# Patient Record
Sex: FEMALE | Race: WHITE | Employment: UNEMPLOYED | URBAN - METROPOLITAN AREA
[De-identification: names, ages, dates, MRNs, and addresses within clinical notes are randomized per-mention and may not be internally consistent; named-entity substitution may affect disease eponyms.]

---

## 2019-06-12 RX ORDER — TROPICAMIDE 10 MG/ML
1 SOLUTION/ DROPS OPHTHALMIC
Status: CANCELLED | OUTPATIENT
Start: 2019-06-12 | End: 2019-06-12

## 2019-06-12 RX ORDER — CYCLOPENTOLATE HYDROCHLORIDE 20 MG/ML
1 SOLUTION/ DROPS OPHTHALMIC
Status: CANCELLED | OUTPATIENT
Start: 2019-06-12 | End: 2019-06-12

## 2019-06-12 RX ORDER — PHENYLEPHRINE HYDROCHLORIDE 25 MG/ML
1 SOLUTION/ DROPS OPHTHALMIC
Status: CANCELLED | OUTPATIENT
Start: 2019-06-12 | End: 2019-06-12

## 2019-06-24 ENCOUNTER — ANESTHESIA EVENT (OUTPATIENT)
Dept: SURGERY | Age: 59
End: 2019-06-24
Payer: MEDICARE

## 2019-06-25 ENCOUNTER — HOSPITAL ENCOUNTER (OUTPATIENT)
Age: 59
Setting detail: OUTPATIENT SURGERY
Discharge: HOME | End: 2019-06-25
Attending: OPHTHALMOLOGY | Admitting: OPHTHALMOLOGY
Payer: MEDICARE

## 2019-06-25 ENCOUNTER — ANESTHESIA (OUTPATIENT)
Dept: SURGERY | Age: 59
End: 2019-06-25
Payer: MEDICARE

## 2019-06-25 VITALS
OXYGEN SATURATION: 96 % | TEMPERATURE: 98.8 F | SYSTOLIC BLOOD PRESSURE: 148 MMHG | RESPIRATION RATE: 15 BRPM | BODY MASS INDEX: 33.05 KG/M2 | DIASTOLIC BLOOD PRESSURE: 68 MMHG | WEIGHT: 211 LBS | HEART RATE: 56 BPM

## 2019-06-25 PROCEDURE — 76010000138 HC OR TIME 0.5 TO 1 HR: Performed by: OPHTHALMOLOGY

## 2019-06-25 PROCEDURE — 74011250636 HC RX REV CODE- 250/636: Performed by: OPHTHALMOLOGY

## 2019-06-25 PROCEDURE — 74011250637 HC RX REV CODE- 250/637: Performed by: ANESTHESIOLOGY

## 2019-06-25 PROCEDURE — 77030013673 HC FCPS OPTH ALCN -C: Performed by: OPHTHALMOLOGY

## 2019-06-25 PROCEDURE — 74011250636 HC RX REV CODE- 250/636

## 2019-06-25 PROCEDURE — 77030018837 HC SOL IRR OPTH ALCN -A: Performed by: OPHTHALMOLOGY

## 2019-06-25 PROCEDURE — 77030017621 HC NDL OPHTH1 ALCN -B: Performed by: OPHTHALMOLOGY

## 2019-06-25 PROCEDURE — 77030018846 HC SOL IRR STRL H20 ICUM -A: Performed by: OPHTHALMOLOGY

## 2019-06-25 PROCEDURE — 76060000033 HC ANESTHESIA 1 TO 1.5 HR: Performed by: OPHTHALMOLOGY

## 2019-06-25 PROCEDURE — 77030038091: Performed by: OPHTHALMOLOGY

## 2019-06-25 PROCEDURE — 74011000254 HC RX REV CODE- 254: Performed by: OPHTHALMOLOGY

## 2019-06-25 PROCEDURE — 77030029764 HC VLV ENTRY OPHTH SYS ALCN -C: Performed by: OPHTHALMOLOGY

## 2019-06-25 PROCEDURE — 76210000063 HC OR PH I REC FIRST 0.5 HR: Performed by: OPHTHALMOLOGY

## 2019-06-25 PROCEDURE — 76210000021 HC REC RM PH II 0.5 TO 1 HR: Performed by: OPHTHALMOLOGY

## 2019-06-25 PROCEDURE — 77030008547 HC TBNG OPHTH BD -A: Performed by: OPHTHALMOLOGY

## 2019-06-25 PROCEDURE — 77030032623 HC KT VITRCMY TOT PLS ALCN -F: Performed by: OPHTHALMOLOGY

## 2019-06-25 PROCEDURE — 74011250636 HC RX REV CODE- 250/636: Performed by: ANESTHESIOLOGY

## 2019-06-25 PROCEDURE — 77030018838 HC SOL IRR OPTH ALCN -B: Performed by: OPHTHALMOLOGY

## 2019-06-25 PROCEDURE — 74011000250 HC RX REV CODE- 250: Performed by: OPHTHALMOLOGY

## 2019-06-25 RX ORDER — HYDROMORPHONE HYDROCHLORIDE 2 MG/ML
0.5 INJECTION, SOLUTION INTRAMUSCULAR; INTRAVENOUS; SUBCUTANEOUS
Status: DISCONTINUED | OUTPATIENT
Start: 2019-06-25 | End: 2019-06-25 | Stop reason: HOSPADM

## 2019-06-25 RX ORDER — MIDAZOLAM HYDROCHLORIDE 1 MG/ML
2 INJECTION, SOLUTION INTRAMUSCULAR; INTRAVENOUS
Status: COMPLETED | OUTPATIENT
Start: 2019-06-25 | End: 2019-06-25

## 2019-06-25 RX ORDER — OXYCODONE HYDROCHLORIDE 5 MG/1
10 TABLET ORAL
Status: DISCONTINUED | OUTPATIENT
Start: 2019-06-25 | End: 2019-06-25 | Stop reason: HOSPADM

## 2019-06-25 RX ORDER — FENTANYL CITRATE 50 UG/ML
100 INJECTION, SOLUTION INTRAMUSCULAR; INTRAVENOUS ONCE
Status: DISCONTINUED | OUTPATIENT
Start: 2019-06-25 | End: 2019-06-25 | Stop reason: HOSPADM

## 2019-06-25 RX ORDER — PHENYLEPHRINE HYDROCHLORIDE 25 MG/ML
1 SOLUTION/ DROPS OPHTHALMIC
Status: COMPLETED | OUTPATIENT
Start: 2019-06-25 | End: 2019-06-25

## 2019-06-25 RX ORDER — ACETAMINOPHEN 500 MG
1000 TABLET ORAL ONCE
Status: DISCONTINUED | OUTPATIENT
Start: 2019-06-25 | End: 2019-06-25 | Stop reason: HOSPADM

## 2019-06-25 RX ORDER — SODIUM CHLORIDE, SODIUM LACTATE, POTASSIUM CHLORIDE, CALCIUM CHLORIDE 600; 310; 30; 20 MG/100ML; MG/100ML; MG/100ML; MG/100ML
100 INJECTION, SOLUTION INTRAVENOUS CONTINUOUS
Status: DISCONTINUED | OUTPATIENT
Start: 2019-06-25 | End: 2019-06-25 | Stop reason: HOSPADM

## 2019-06-25 RX ORDER — NALOXONE HYDROCHLORIDE 0.4 MG/ML
0.2 INJECTION, SOLUTION INTRAMUSCULAR; INTRAVENOUS; SUBCUTANEOUS AS NEEDED
Status: DISCONTINUED | OUTPATIENT
Start: 2019-06-25 | End: 2019-06-25 | Stop reason: HOSPADM

## 2019-06-25 RX ORDER — BUPIVACAINE HYDROCHLORIDE 5 MG/ML
INJECTION, SOLUTION EPIDURAL; INTRACAUDAL AS NEEDED
Status: DISCONTINUED | OUTPATIENT
Start: 2019-06-25 | End: 2019-06-25 | Stop reason: HOSPADM

## 2019-06-25 RX ORDER — MIDAZOLAM HYDROCHLORIDE 1 MG/ML
2 INJECTION, SOLUTION INTRAMUSCULAR; INTRAVENOUS ONCE
Status: DISCONTINUED | OUTPATIENT
Start: 2019-06-25 | End: 2019-06-25 | Stop reason: HOSPADM

## 2019-06-25 RX ORDER — OXYCODONE HYDROCHLORIDE 5 MG/1
5 TABLET ORAL
Status: COMPLETED | OUTPATIENT
Start: 2019-06-25 | End: 2019-06-25

## 2019-06-25 RX ORDER — CYCLOPENTOLATE HYDROCHLORIDE 20 MG/ML
1 SOLUTION/ DROPS OPHTHALMIC
Status: COMPLETED | OUTPATIENT
Start: 2019-06-25 | End: 2019-06-25

## 2019-06-25 RX ORDER — PROPOFOL 10 MG/ML
INJECTION, EMULSION INTRAVENOUS AS NEEDED
Status: DISCONTINUED | OUTPATIENT
Start: 2019-06-25 | End: 2019-06-25 | Stop reason: HOSPADM

## 2019-06-25 RX ORDER — LIDOCAINE HYDROCHLORIDE 10 MG/ML
0.1 INJECTION INFILTRATION; PERINEURAL AS NEEDED
Status: DISCONTINUED | OUTPATIENT
Start: 2019-06-25 | End: 2019-06-25 | Stop reason: HOSPADM

## 2019-06-25 RX ORDER — FLUMAZENIL 0.1 MG/ML
0.2 INJECTION INTRAVENOUS
Status: DISCONTINUED | OUTPATIENT
Start: 2019-06-25 | End: 2019-06-25 | Stop reason: HOSPADM

## 2019-06-25 RX ORDER — SODIUM CHLORIDE 0.9 % (FLUSH) 0.9 %
5-40 SYRINGE (ML) INJECTION AS NEEDED
Status: DISCONTINUED | OUTPATIENT
Start: 2019-06-25 | End: 2019-06-25 | Stop reason: HOSPADM

## 2019-06-25 RX ORDER — BETAMETHASONE SODIUM PHOSPHATE AND BETAMETHASONE ACETATE 3; 3 MG/ML; MG/ML
INJECTION, SUSPENSION INTRA-ARTICULAR; INTRALESIONAL; INTRAMUSCULAR; SOFT TISSUE AS NEEDED
Status: DISCONTINUED | OUTPATIENT
Start: 2019-06-25 | End: 2019-06-25 | Stop reason: HOSPADM

## 2019-06-25 RX ORDER — FENTANYL CITRATE 50 UG/ML
INJECTION, SOLUTION INTRAMUSCULAR; INTRAVENOUS AS NEEDED
Status: DISCONTINUED | OUTPATIENT
Start: 2019-06-25 | End: 2019-06-25 | Stop reason: HOSPADM

## 2019-06-25 RX ORDER — CEFAZOLIN SODIUM 1 G/3ML
INJECTION, POWDER, FOR SOLUTION INTRAMUSCULAR; INTRAVENOUS AS NEEDED
Status: DISCONTINUED | OUTPATIENT
Start: 2019-06-25 | End: 2019-06-25 | Stop reason: HOSPADM

## 2019-06-25 RX ORDER — INDOCYANINE GREEN AND WATER 25 MG
KIT INJECTION AS NEEDED
Status: DISCONTINUED | OUTPATIENT
Start: 2019-06-25 | End: 2019-06-25 | Stop reason: HOSPADM

## 2019-06-25 RX ORDER — SODIUM CHLORIDE 0.9 % (FLUSH) 0.9 %
5-40 SYRINGE (ML) INJECTION EVERY 8 HOURS
Status: DISCONTINUED | OUTPATIENT
Start: 2019-06-25 | End: 2019-06-25 | Stop reason: HOSPADM

## 2019-06-25 RX ORDER — LIDOCAINE HYDROCHLORIDE 20 MG/ML
INJECTION, SOLUTION INFILTRATION; PERINEURAL AS NEEDED
Status: DISCONTINUED | OUTPATIENT
Start: 2019-06-25 | End: 2019-06-25 | Stop reason: HOSPADM

## 2019-06-25 RX ORDER — DIPHENHYDRAMINE HYDROCHLORIDE 50 MG/ML
12.5 INJECTION, SOLUTION INTRAMUSCULAR; INTRAVENOUS
Status: DISCONTINUED | OUTPATIENT
Start: 2019-06-25 | End: 2019-06-25 | Stop reason: HOSPADM

## 2019-06-25 RX ORDER — TROPICAMIDE 10 MG/ML
1 SOLUTION/ DROPS OPHTHALMIC
Status: COMPLETED | OUTPATIENT
Start: 2019-06-25 | End: 2019-06-25

## 2019-06-25 RX ADMIN — CYCLOPENTOLATE HYDROCHLORIDE 1 DROP: 20 SOLUTION/ DROPS OPHTHALMIC at 08:00

## 2019-06-25 RX ADMIN — FENTANYL CITRATE 50 MCG: 50 INJECTION, SOLUTION INTRAMUSCULAR; INTRAVENOUS at 09:29

## 2019-06-25 RX ADMIN — CYCLOPENTOLATE HYDROCHLORIDE 1 DROP: 20 SOLUTION/ DROPS OPHTHALMIC at 08:10

## 2019-06-25 RX ADMIN — PHENYLEPHRINE HYDROCHLORIDE 1 DROP: 25 SOLUTION/ DROPS OPHTHALMIC at 08:05

## 2019-06-25 RX ADMIN — FENTANYL CITRATE 50 MCG: 50 INJECTION, SOLUTION INTRAMUSCULAR; INTRAVENOUS at 09:31

## 2019-06-25 RX ADMIN — TROPICAMIDE 1 DROP: 10 SOLUTION/ DROPS OPHTHALMIC at 08:00

## 2019-06-25 RX ADMIN — TROPICAMIDE 1 DROP: 10 SOLUTION/ DROPS OPHTHALMIC at 08:10

## 2019-06-25 RX ADMIN — OXYCODONE HYDROCHLORIDE 5 MG: 5 TABLET ORAL at 10:04

## 2019-06-25 RX ADMIN — CYCLOPENTOLATE HYDROCHLORIDE 1 DROP: 20 SOLUTION/ DROPS OPHTHALMIC at 08:05

## 2019-06-25 RX ADMIN — PHENYLEPHRINE HYDROCHLORIDE 1 DROP: 25 SOLUTION/ DROPS OPHTHALMIC at 08:10

## 2019-06-25 RX ADMIN — PROPOFOL 20 MG: 10 INJECTION, EMULSION INTRAVENOUS at 09:33

## 2019-06-25 RX ADMIN — PROPOFOL 20 MG: 10 INJECTION, EMULSION INTRAVENOUS at 08:51

## 2019-06-25 RX ADMIN — MIDAZOLAM HYDROCHLORIDE 2 MG: 2 INJECTION, SOLUTION INTRAMUSCULAR; INTRAVENOUS at 08:12

## 2019-06-25 RX ADMIN — SODIUM CHLORIDE, SODIUM LACTATE, POTASSIUM CHLORIDE, AND CALCIUM CHLORIDE 100 ML/HR: 600; 310; 30; 20 INJECTION, SOLUTION INTRAVENOUS at 07:35

## 2019-06-25 RX ADMIN — TROPICAMIDE 1 DROP: 10 SOLUTION/ DROPS OPHTHALMIC at 08:05

## 2019-06-25 RX ADMIN — PROPOFOL 30 MG: 10 INJECTION, EMULSION INTRAVENOUS at 08:50

## 2019-06-25 RX ADMIN — HYDROMORPHONE HYDROCHLORIDE 0.5 MG: 2 INJECTION INTRAMUSCULAR; INTRAVENOUS; SUBCUTANEOUS at 10:01

## 2019-06-25 RX ADMIN — PHENYLEPHRINE HYDROCHLORIDE 1 DROP: 25 SOLUTION/ DROPS OPHTHALMIC at 08:00

## 2019-06-25 RX ADMIN — HYDROMORPHONE HYDROCHLORIDE 0.5 MG: 2 INJECTION INTRAMUSCULAR; INTRAVENOUS; SUBCUTANEOUS at 09:54

## 2019-06-25 NOTE — PERIOP NOTES
PACU DISCHARGE NOTE  Vital signs stable, pain well controlled, alert and oriented times three or at baseline, no anesthetic complications. IV removed with catheter tip intact. Written and verbal discharge instructions given, including pain control, dressing care and follow up appointment. Daughter Angelique verbalized understanding and signed discharge instructions electronically. All questions answered prior to discharge. Dr Ольга Mckeon okay to discharge at this time. Pt and all belongings taken via wheelchair and safely put in vehicle.

## 2019-06-25 NOTE — ANESTHESIA PREPROCEDURE EVALUATION
Relevant Problems   No relevant active problems       Anesthetic History   No history of anesthetic complications            Review of Systems / Medical History  Patient summary reviewed and pertinent labs reviewed    Pulmonary            Asthma        Neuro/Psych         Headaches and psychiatric history     Cardiovascular    Hypertension          Hyperlipidemia    Exercise tolerance: >4 METS     GI/Hepatic/Renal     GERD      PUD     Endo/Other        Obesity and arthritis     Other Findings            Physical Exam    Airway  Mallampati: II  TM Distance: 4 - 6 cm  Neck ROM: normal range of motion   Mouth opening: Normal     Cardiovascular    Rhythm: regular  Rate: normal         Dental    Dentition: Caps/crowns     Pulmonary  Breath sounds clear to auscultation               Abdominal         Other Findings            Anesthetic Plan    ASA: 2  Anesthesia type: total IV anesthesia          Induction: Intravenous  Anesthetic plan and risks discussed with: Patient and Family

## 2019-06-25 NOTE — ANESTHESIA POSTPROCEDURE EVALUATION
Procedure(s):  LEFT EYE VITRECTOMY POSTERIOR 25 GAUGE LASER SCAR TISSUE REMOVAL.     total IV anesthesia    Anesthesia Post Evaluation      Multimodal analgesia: multimodal analgesia used between 6 hours prior to anesthesia start to PACU discharge  Patient location during evaluation: PACU  Patient participation: complete - patient participated  Level of consciousness: awake and alert  Pain management: adequate  Airway patency: patent  Anesthetic complications: no  Cardiovascular status: acceptable and hemodynamically stable  Respiratory status: acceptable  Hydration status: acceptable        Vitals Value Taken Time   /78 6/25/2019 10:05 AM   Temp 37 °C (98.6 °F) 6/25/2019  9:47 AM   Pulse 59 6/25/2019 10:05 AM   Resp 15 6/25/2019 10:05 AM   SpO2 96 % 6/25/2019 10:05 AM

## 2019-06-25 NOTE — BRIEF OP NOTE
BRIEF OPERATIVE NOTE    Date of Procedure: 6/25/2019   Preoperative Diagnosis: Macular pucker, left eye [H35.372] Branch retinal vein occlusion left eye  Postoperative Diagnosis: Macular pucker, left eye [H35.372]  Branch retinal vein occlusion left eye  Procedure(s):  LEFT EYE VITRECTOMY POSTERIOR 25 GAUGE LASER SCAR TISSUE REMOVAL  Surgeon(s) and Role:     * Varinder Enciso MD - Primary         Surgical Assistant: None    Surgical Staff:  Circ-1: Mitzy Cummins RN  Scrub Tech-1: Enedelia Guzman  Event Time In Time Out   Incision Start 0337    Incision Close 6891      Anesthesia: General   Estimated Blood Loss: None  Specimens: * No specimens in log *   Findings: Diffuse macular pucker, shunt vessels, left eye.   [id number]  Complications: None  Implants: * No implants in log *

## 2019-06-25 NOTE — OP NOTES
[facility]  OPERATIVE REPORT    Name:  Cyrena Epley  MR#:  [id number]  :  1960  ACCOUNT #:  [de-identified]  DATE OF SERVICE:  2019    PREOPERATIVE DIAGNOSES:  1.  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  2.  Status post branch retinal vein occlusion in the left eye. POSTOPERATIVE DIAGNOSES:  1.  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  2.  Status post branch retinal vein occlusion in the left eye. PROCEDURE PERFORMED:  1. Trans pars plana vitrectomy with epiretinal membrane removal of the left eye. 2.  Peripheral laser treatment, left eye. SURGEON:  Elvin Walton. Fernie MOULTON MD    ASSISTANT:  None. ANESTHESIA:  MAC.    COMPLICATIONS:  None. SPECIMENS REMOVED:  None. IMPLANTS:  None. ESTIMATED BLOOD LOSS:  None. INDICATIONS FOR SURGERY:  The patient has experienced significant loss of vision in the left eye associated with both a branch retinal vein occlusion and macular pucker. Surgery recommended to remove the macular pucker in order to improve vision. The risks and benefits of the surgical procedure were thoroughly reviewed and the patient wished to proceed. SUMMARY OF PROCEDURE:  After appropriate preoperative evaluation and signing of the permit, the patient was taken to the operating room and placed in a supine fashion upon the procedure table. A time-out was then performed and all operating room personnel confirmed the patient's identity, the surgical site, and procedures to be performed. The patient was then given IV sedation as well as a lid block consisting of 5 mL of 50:50 solution 2% Xylocaine and 0.75% Marcaine. This was given in a modified [employer] fashion. A retrobulbar injection consisting of 5 mL of the same solution was then given. Once an appropriate level of akinesia of extraocular muscle was achieved, the patient was draped and prepped in sterile ophthalmic fashion with 5% Betadine solution.   The conjunctiva and periorbital tissues were carefully prepped. A 25-gauge 4-mm infusion cannula was placed 4 mm posterior to the limbus at the 4 o'clock position. Once the tip of the cannula was visualized and posterior segment found to be clear, it was turned to the on position and the intraocular pressure was adjusted to 30 mmHg. Two additional cannulas were placed at the 10 and 2 o'clock positions 4 mm posterior to the limbus. The handheld fiberoptic tube was inserted into the nasal cannula site while the Microvit instrument was inserted in the temporal cannula site. Visualization of the posterior segment was achieved using overhead microscope and a handheld contact lens. Examination of the posterior segment confirmed significant diffuse epiretinal membranes overlying the entire posterior pole area. Shunt vessels were noted along the proximal inferior temporal arcades. These were present within the area of the branch retinal vein occlusion. Significant macular distortion was present from the macular pucker. A thorough central vitrectomy was performed. Care was taken to trim the vitreous to the far periphery in all quadrants. Once this was completed, 0.3 mL of ICG dye was placed over the posterior pole region and allowed to settle for approximately 1 minute. The dye was then removed with the flute needle. This allowed staining of the epiretinal and internal limiting membranes. These membranes were then carefully dissected from the retinal surface using end-gripping forceps. All traction was removed from the macular region. The peripheral retina was then inspected. No retinal breaks or tears were identified. The indirect laser was used to apply laser treatment throughout the far periphery between the ora aashish and the vortex veins. A total of 562 spots were placed. All instruments were then removed from the eye as were the cannulas. No leakage was noted from the cannula sites.   A subconjunctival injection consisting of 0.5 mL of Ancef and 0.5 mL of betamethasone was given in the inferior temporal quadrant. Prednisolone, ofloxacin and cyclopentolate were placed in the inferior fornix. A patch and shield were then placed over the left eye. The patient tolerated the procedure well and was taken to the recovery room for routine postoperative care.         Josette Leija MD [email or web address]  D:  06/25/2019 9:50  T:  06/25/2019 9:55  JOB #:  [id number]

## 2019-06-25 NOTE — DISCHARGE INSTRUCTIONS
[facility] [address] MD Casie Melo MD Edilia Clayton. Caryle Raddle, MD Aniceto Duncan. Manuelito Mora MD    [phone] or [phone] or [phone] or [phone]    Post Operative Instructions for Retina and Vitreous Surgery Patients    The following are a few guidelines that you should observe for two weeks after surgery:    1. Avoid stooping over, lifting heavy weights or bumping your head. 2.  Special positioning: Rest on extra pillow    3. No extensive traveling except what is necessary. Avoid air travel until you consult your doctor. 4.  Men may shave after the third day. 5.  You may watch television, read, cook and wash dishes as long as it does not interfere        with special positioning instructions. 6.  Alcoholic beverages may be used in moderation. 7.  No sexual intercourse. 8.  You  may bathe the eyelids daily with cotton or a tissue moistened with warm tap       water. Do not bathe the eyeball itself. 9.  Some discharge from the operated eye is to be expected. This should gradually        improve. 10. Change the eye pad at least once daily. You may discontinue the eye pad whenever        comfortable to do so (usually three days after the operation). Wear the eye shield or        glasses at all times. 11. If you experience increasing pain, decreasing vision, or pus like discharge, call the        Office. 12. Medication Instructions:         Prednisolone 1% - one drop in the operated eye 4 times a day. Ofloxacin (antibiotic drop) - one drop in operated eye 4 times a day. BRING ALL EYE DROPS TO YOU POSTOPERATIVE APPOINTMENT! Voiced or demonstrated understanding:  YES    TYPICAL SIDE EFFECTS OF PAIN MEDICATION:  *    Constipation: Drink lots of fluids, try prune juice. OTC stool softener if needed. *    Nausea:   Take pain medication with food. ACTIVITY  · As tolerated and as directed by your doctor. · Bathe or shower as directed by your doctor. DIET  · Day of surgery: Clear liquids until no nausea or vomiting; small portion, light diet [place] foods (ex: baked chicken, plain rice, grits, scrambled eggs, toast). Nothing greasy, fried or spicy today. · Advance to regular diet on second day, unless your doctor orders otherwise. · If nausea and vomiting continues, call your doctor. PAIN  · Take pain medication as directed by your doctor. · DO NOT take aspirin or blood thinners unless directed by your doctor. AFTER ANESTHESIA   · For the first 24 hours: DO NOT Drive, Drink alcoholic beverages, or Make important decisions. · Be aware of dizziness following anesthesia and while taking pain medication. DISCHARGE SUMMARY from Nurse    PATIENT INSTRUCTIONS:    After general anesthesia or intravenous sedation, for 24 hours or while taking prescription Narcotics:  · Limit your activities  · Do not drive and operate hazardous machinery  · Do not make important personal or business decisions  · Do  not drink alcoholic beverages  · If you have not urinated within 8 hours after discharge, please contact your surgeon on call. *  Please give a list of your current medications to your Primary Care Provider. *  Please update this list whenever your medications are discontinued, doses are      changed, or new medications (including over-the-counter products) are added. *  Please carry medication information at all times in case of emergency situations. Preventing Infection at Home  We care about preventing infection and avoiding the spread of germs - not only when you are in the hospital but also when you return home. When you return home from the hospital, its important to take the following steps to help prevent infection and avoid spreading germs that could infect you and others.  Ask everyone in your home to follow these guidelines, too. Clean Your Hands  · Clean your hands whenever your hands are visibly dirty, before you eat, before or after touching your mouth, nose or eyes, and before preparing food. Clean them after contact with body fluids, using the restroom, touching animals or changing diapers. · When washing hands, wet them with warm water and work up a lather. Rub hands for at least 15 seconds, then rinse them and pat them dry with a clean towel or paper towel. · When using hand sanitizers, it should take about 15 seconds to rub your hands dry. If not, you probably didnt apply enough . Cover Your Sneeze or Cough  Germs are released into the air whenever you sneeze or cough. To prevent the spread of infection:  · Turn away from other people before coughing or sneezing. · Cover your mouth or nose with a tissue when you cough or sneeze. Put the tissue in the trash. · If you dont have a tissue, cough or sneeze into your upper sleeve, not your hands. · Always clean your hands after coughing or sneezing. Care for Wounds  Your skin is your bodys first line of defense against germs, but an open wound leaves an easy way for germs to enter your body. To prevent infection:  · Clean your hands before and after changing wound dressings, and wear gloves to change dressings if recommended by your doctor. · Take special care with IV lines or other devices inserted into the body. If you must touch them, clean your hands first.  · Follow any specific instructions from your doctor to care for your wounds. Contact your doctor if you experience any signs of infection, such as fever or increased redness at the surgical or wound site. Keep a Clean Home  · Clean or wipe commonly touched hard surfaces like door handles, sinks, tabletops, phones and TV remotes. · Use products labeled disinfectant to kill harmful bacteria and viruses.   · Use a clean cloth or paper towel to clean and dry surfaces. Wiping surfaces with a dirty dishcloth, sponge or towel will only spread germs. · Never share toothbrushes, chong, drinking glasses, utensils, razor blades, face cloths or bath towels to avoid spreading germs. · Be sure that the linens that you sleep on are clean. · Keep pets away from wounds and wash your hands after touching pets, their toys or bedding. We care about you and your health. Remember, preventing infections is a team effort between you, your family, friends and health care providers. These are general instructions for a healthy lifestyle:    No smoking/ No tobacco products/ Avoid exposure to second hand smoke    Surgeon General's Warning:  Quitting smoking now greatly reduces serious risk to your health. Obesity, smoking, and sedentary lifestyle greatly increases your risk for illness    A healthy diet, regular physical exercise & weight monitoring are important for maintaining a healthy lifestyle    You may be retaining fluid if you have a history of heart failure or if you experience any of the following symptoms:  Weight gain of 3 pounds or more overnight or 5 pounds in a week, increased swelling in our hands or feet or shortness of breath while lying flat in bed. Please call your doctor as soon as you notice any of these symptoms; do not wait until your next office visit. Recognize signs and symptoms of STROKE:    F-face looks uneven    A-arms unable to move or move unevenly    S-speech slurred or non-existent    T-time-call 911 as soon as signs and symptoms begin-DO NOT go       Back to bed or wait to see if you get better-TIME IS BRAIN.

## (undated) DEVICE — CANNULA OPHTH 2 BOR 25 GA 0.5 MM SS

## (undated) DEVICE — ADVANCED DSP BACKFLUSH BLUNT, 25G: Brand: ALCON GRIESHABER

## (undated) DEVICE — NEEDLE HYPO 25GA L5/8IN ORNG HUB S STL LATCH BVL UP

## (undated) DEVICE — PAD,EYE,1-5/8X2 5/8,STERILE,LF,1/PK: Brand: MEDLINE

## (undated) DEVICE — SOLUTION IV STRL H2O 500 ML AQUALITE POUR BTL

## (undated) DEVICE — Z DISCONTINUED NO SUB IDED SHIELD EYE PLAS RT BGE M 7.5CMX5.7CM VISITEC

## (undated) DEVICE — 23 GA VALVED TROCAR CANNULA ENTRY SYSTEM, 3 CT: Brand: ALCON

## (undated) DEVICE — SOLUTION IRRIGATION BAL SALT SOLUTION 500 ML BTL 6/CA BSS +

## (undated) DEVICE — SOLUTION IRRIGATION BAL SALT SOLUTION 500 ML STRL BSS

## (undated) DEVICE — IRRIGATION KT OPHTH 80IN --

## (undated) DEVICE — REVOLUTION DSP 23G ILM FORCEPS: Brand: ALCON GRIESHABER REVOLUTION

## (undated) DEVICE — NEEDLE HYPO 27GA L1.25IN GRY POLYPR HUB S STL REG BVL STR

## (undated) DEVICE — SURGICAL PROCEDURE PACK EYE CDS

## (undated) DEVICE — SYR 5ML 1/5 GRAD LL NSAF LF --

## (undated) DEVICE — CONSTELLATION VISION SYSTEM 5000 CPM ULTRAVIT PROBE STRAIGHT ENDOILLUMINATOR 25+ TOTALPLUS VITRECTOMY PAK EDGEPLUS BLADE VALVED ENTRY SYSTEM: Brand: CONSTELLATION, ULTRAVIT, 25+, TOTALPLUS, EDGEPLUS

## (undated) DEVICE — (D)STRIP SKN CLSR 0.5X4IN WHT --

## (undated) DEVICE — EYE SPEAR / FINE DISSECTOR: Brand: DEROYAL

## (undated) DEVICE — 3M™ TEGADERM™ TRANSPARENT FILM DRESSING FRAME STYLE, 1626W, 4 IN X 4-3/4 IN (10 CM X 12 CM), 50/CT 4CT/CASE: Brand: 3M™ TEGADERM™

## (undated) DEVICE — COVER,MAYO STAND,STERILE: Brand: MEDLINE